# Patient Record
Sex: FEMALE | Race: WHITE | Employment: STUDENT | URBAN - METROPOLITAN AREA
[De-identification: names, ages, dates, MRNs, and addresses within clinical notes are randomized per-mention and may not be internally consistent; named-entity substitution may affect disease eponyms.]

---

## 2020-05-27 ENCOUNTER — APPOINTMENT (OUTPATIENT)
Dept: GENERAL RADIOLOGY | Facility: HOSPITAL | Age: 20
End: 2020-05-27
Payer: COMMERCIAL

## 2020-05-27 ENCOUNTER — HOSPITAL ENCOUNTER (EMERGENCY)
Facility: HOSPITAL | Age: 20
Discharge: HOME | End: 2020-05-27
Attending: PHYSICIAN ASSISTANT
Payer: COMMERCIAL

## 2020-05-27 VITALS
DIASTOLIC BLOOD PRESSURE: 73 MMHG | HEART RATE: 105 BPM | BODY MASS INDEX: 27.31 KG/M2 | HEIGHT: 64 IN | RESPIRATION RATE: 18 BRPM | OXYGEN SATURATION: 99 % | TEMPERATURE: 98 F | WEIGHT: 160 LBS | SYSTOLIC BLOOD PRESSURE: 126 MMHG

## 2020-05-27 DIAGNOSIS — S62.002A OCCULT CLOSED FRACTURE OF SCAPHOID OF LEFT WRIST, INITIAL ENCOUNTER: Primary | ICD-10-CM

## 2020-05-27 DIAGNOSIS — R03.0 ELEVATED BLOOD PRESSURE READING: ICD-10-CM

## 2020-05-27 PROCEDURE — 99284 EMERGENCY DEPT VISIT MOD MDM: CPT

## 2020-05-27 PROCEDURE — 29125 APPL SHORT ARM SPLINT STATIC: CPT

## 2020-05-27 PROCEDURE — 73110 X-RAY EXAM OF WRIST: CPT | Performed by: PHYSICIAN ASSISTANT

## 2020-05-27 RX ORDER — IBUPROFEN 600 MG/1
TABLET ORAL
Qty: 20 TABLET | Refills: 0 | Status: SHIPPED | OUTPATIENT
Start: 2020-05-27

## 2020-05-27 NOTE — ED INITIAL ASSESSMENT (HPI)
Patient presents to ED with left wrist pain post falling while rollerblading today. Denies head injury, denies loc.

## 2020-05-27 NOTE — ED PROVIDER NOTES
Patient Seen in: [facility] AND [facility]    History   Patient presents with:  Upper Extremity Injury    Stated Complaint: wrist injury    HPI    HPI: Noemí Barton is a 23year old female who presents with chief complaint of left wrist pa Normocephalic/atraumatic. Eyes: Pupils are equal round reactive to light. Conjunctiva are within normal limits. ENT: Oropharynx is clear. Neck: The neck is supple. No meningeal signs. Chest: There is no tenderness to the chest wall.   Respiratory: Res 5/27/2020  CONCLUSION: Normal examination.      Dictated by (CST): Kam Toledo MD on 5/27/2020 at 5:05 PM     Finalized by (CST): Kandice Kiser MD on 5/27/2020 at 5:06 PM            Physical exam remained stable over serial reexaminati

## 2020-05-27 NOTE — ED NOTES
Patient provided discharge instructions. Instructions reviewed with patient. Patient verbalized understanding. All questions/concerns addressed. CMS intact post splint application. Patient aware of care instructions.